# Patient Record
Sex: MALE | Race: WHITE | NOT HISPANIC OR LATINO | Employment: FULL TIME | URBAN - METROPOLITAN AREA
[De-identification: names, ages, dates, MRNs, and addresses within clinical notes are randomized per-mention and may not be internally consistent; named-entity substitution may affect disease eponyms.]

---

## 2018-05-23 ENCOUNTER — OFFICE VISIT (OUTPATIENT)
Dept: INTERNAL MEDICINE | Facility: CLINIC | Age: 53
End: 2018-05-23
Payer: COMMERCIAL

## 2018-05-23 VITALS
WEIGHT: 179.4 LBS | TEMPERATURE: 98 F | RESPIRATION RATE: 16 BRPM | SYSTOLIC BLOOD PRESSURE: 108 MMHG | HEIGHT: 70 IN | DIASTOLIC BLOOD PRESSURE: 62 MMHG | BODY MASS INDEX: 25.68 KG/M2 | HEART RATE: 56 BPM

## 2018-05-23 DIAGNOSIS — Z71.84 COUNSELING ABOUT TRAVEL: Primary | ICD-10-CM

## 2018-05-23 DIAGNOSIS — Z23 NEED FOR MMR VACCINE: ICD-10-CM

## 2018-05-23 DIAGNOSIS — Z23 NEED FOR VACCINATION: ICD-10-CM

## 2018-05-23 PROCEDURE — 90471 IMMUNIZATION ADMIN: CPT | Performed by: PHYSICIAN ASSISTANT

## 2018-05-23 PROCEDURE — 99214 OFFICE O/P EST MOD 30 MIN: CPT | Performed by: PHYSICIAN ASSISTANT

## 2018-05-23 PROCEDURE — 90707 MMR VACCINE SC: CPT | Performed by: PHYSICIAN ASSISTANT

## 2018-05-23 RX ORDER — ACETAZOLAMIDE 125 MG/1
125 TABLET ORAL 2 TIMES DAILY
Qty: 6 TABLET | Refills: 0 | Status: SHIPPED | OUTPATIENT
Start: 2018-05-23 | End: 2019-10-28

## 2018-05-23 RX ORDER — CIPROFLOXACIN 500 MG/1
500 TABLET, FILM COATED ORAL 2 TIMES DAILY
Qty: 6 TABLET | Refills: 0 | Status: SHIPPED | OUTPATIENT
Start: 2018-05-23 | End: 2019-10-28

## 2018-05-23 ASSESSMENT — PAIN SCALES - GENERAL: PAINLEVEL: NO PAIN (0)

## 2018-05-23 NOTE — MR AVS SNAPSHOT
"              After Visit Summary   2018    Skyler Villafana    MRN: [id number]           Patient Information     Date Of Birth          1965        Visit Information        Provider Department      2018 4:40 PM Nazanin Maradiaga PA-C [facility]        Today's Diagnoses     Counseling about travel    -  1    Need for MMR vaccine           Follow-ups after your visit        Who to contact     If you have questions or need follow up information about today's clinic visit or your schedule please contact [facility] directly at [phone].  Normal or non-critical lab and imaging results will be communicated to you by Segetishart, letter or phone within 4 business days after the clinic has received the results. If you do not hear from us within 7 days, please contact the clinic through Segetishart or phone. If you have a critical or abnormal lab result, we will notify you by phone as soon as possible.  Submit refill requests through uSpeak or call your pharmacy and they will forward the refill request to us. Please allow 3 business days for your refill to be completed.          Additional Information About Your Visit        MyChart Information     uSpeak lets you send messages to your doctor, view your test results, renew your prescriptions, schedule appointments and more. To sign up, go to [email or web address] . Click on \"Log in\" on the left side of the screen, which will take you to the Welcome page. Then click on \"Sign up Now\" on the right side of the page.     You will be asked to enter the access code listed below, as well as some personal information. Please follow the directions to create your username and password.     Your access code is: [email or web address]  Expires: 2018  5:05 PM     Your access code will  in 90 days. If you need help or a new code, please call your [facility] or [phone].        Care EveryWhere ID     This is your " "Care EveryWhere ID. This could be used by other organizations to access your [facility] medical records  [id number]        Your Vitals Were     Pulse Temperature Respirations Height BMI (Body Mass Index)       56 98  F (36.7  C) (Oral) 16 5' 9.5\" (1.765 m) 26.11 kg/m2        Blood Pressure from Last 3 Encounters:   05/23/18 108/62   11/06/15 124/70   07/29/10 122/64    Weight from Last 3 Encounters:   05/23/18 179 lb 6.4 oz (81.4 kg)   11/06/15 173 lb (78.5 kg)   07/29/10 170 lb (77.1 kg)              Today, you had the following     No orders found for display         Today's Medication Changes          These changes are accurate as of 5/23/18  5:05 PM.  If you have any questions, ask your nurse or doctor.               Start taking these medicines.        Dose/Directions    acetaZOLAMIDE 125 MG tablet   Commonly known as:  DIAMOX   Used for:  Counseling about travel   Started by:  Nazanin Maradiaga PA-C        Dose:  125 mg   Take 1 tablet (125 mg) by mouth 2 times daily   Quantity:  6 tablet   Refills:  0       ciprofloxacin 500 MG tablet   Commonly known as:  CIPRO   Used for:  Counseling about travel   Started by:  Nazanin Maradiaga PA-C        Dose:  500 mg   Take 1 tablet (500 mg) by mouth 2 times daily   Quantity:  6 tablet   Refills:  0            Where to get your medicines      These medications were sent to [facility] [id number] [place] [address] [address] [facility] [place] [address], [facility] [address]     Phone:  [phone]     ciprofloxacin 500 MG tablet         Some of these will need a paper prescription and others can be bought over the counter.  Ask your nurse if you have questions.     Bring a paper prescription for each of these medications     acetaZOLAMIDE 125 MG tablet                Primary Care Provider    None Specified       No primary provider on file.        Equal Access to Services     MANDIE YANG AH: Adonay Galicia, " yohana sue, alana kacorinna maldonado, fredrick carmonaaaalyssa ah. [facility] [phone].    ATENCIÓN: Si nirali mckinney, tiene a smalls disposición servicios gratuitos de asistencia lingüística. Elo al [phone].    We comply with applicable federal civil rights laws and [place] laws. We do not discriminate on the basis of race, color, national origin, age, disability, sex, sexual orientation, or gender identity.            Thank you!     Thank you for choosing [facility]  for your care. Our goal is always to provide you with excellent care. Hearing back from our patients is one way we can continue to improve our services. Please take a few minutes to complete the written survey that you may receive in the mail after your visit with us. Thank you!             Your Updated Medication List - Protect others around you: Learn how to safely use, store and throw away your medicines at www.disposemymeds.org.          This list is accurate as of 5/23/18  5:05 PM.  Always use your most recent med list.                   Brand Name Dispense Instructions for use Diagnosis    acetaZOLAMIDE 125 MG tablet    DIAMOX    6 tablet    Take 1 tablet (125 mg) by mouth 2 times daily    Counseling about travel       ciprofloxacin 500 MG tablet    CIPRO    6 tablet    Take 1 tablet (500 mg) by mouth 2 times daily    Counseling about travel       NO ACTIVE MEDICATIONS      .

## 2018-05-23 NOTE — PROGRESS NOTES
SUBJECTIVE:   Skyler Villafana is a 52 year old male who presents to clinic today for the following health issues:      Pt is here today because he needs a MMR shot for traveling to [facility] in September, along with some anti diarrheal medication as well.     SUBJECTIVE: Skyler Villafana , a 52 year old  male, presents for counseling and information regarding upcoming travel to [facility]. Special medical concerns   include: none He. anticipates the following unusual   exposures: altitude will be > [address] ft .    Past Medical History:   Diagnosis Date     NO ACTIVE PROBLEMS       Immunization History   Administered Date(s) Administered     HEPA 02/05/1998, 04/18/2012, 11/15/2012     Influenza (H1N1) 01/27/2010     Influenza (IIV3) PF 11/12/2005, 11/05/2009     MMR 01/13/1970     TDAP Vaccine (Adacel) 04/13/2012     Typhoid IM 04/18/2012       Current Outpatient Prescriptions   Medication Sig Dispense Refill     NO ACTIVE MEDICATIONS .       Allergies   Allergen Reactions     No Known Allergies      Seasonal Allergies         EXAM: deferred    Immunizations discussed include: Measles/Mumps/Rubella    Patient updated on Yellow Fever and Typhoid   Malaraia prophylaxis recommended: none  Symptomatic treatment for traveler's diarrhea: ciprofloxacin    ASSESSMENT/PLAN:  No diagnosis found.  I have reviewed general recommendations for safe travel   including: food/water precautions, insect avoidance, safe sex   practices given high prevalence of HIV and other STDs,   roadway safety. Educational materials and links to the [facility]   Traveler's health website have been provided.    Total time 25 minutes, greater than 50 percent in counseling   and coordination of care.      Problem list and histories reviewed & adjusted, as indicated.  Additional history: as documented    Labs reviewed in EPIC    Reviewed and updated as needed this visit by clinical staff  Tobacco  Allergies  Meds       Reviewed and updated as needed this visit by  Provider  Allergies  Meds

## 2018-05-23 NOTE — NURSING NOTE
Screening Questionnaire for Adult Immunization    Are you sick today?   No   Do you have allergies to medications, food, a vaccine component or latex?   No   Have you ever had a serious reaction after receiving a vaccination?   No   Do you have a long-term health problem with heart disease, lung disease, asthma, kidney disease, metabolic disease (e.g. diabetes), anemia, or other blood disorder?   No   Do you have cancer, leukemia, HIV/AIDS, or any other immune system problem?   No   In the past 3 months, have you taken medications that affect  your immune system, such as prednisone, other steroids, or anticancer drugs; drugs for the treatment of rheumatoid arthritis, Crohn s disease, or psoriasis; or have you had radiation treatments?   No   Have you had a seizure, or a brain or other nervous system problem?   No   During the past year, have you received a transfusion of blood or blood     products, or been given immune (gamma) globulin or antiviral drug?   No   For women: Are you pregnant or is there a chance you could become        pregnant during the next month?   No   Have you received any vaccinations in the past 4 weeks?   No     Immunization questionnaire answers were all negative.        Per orders of  Nazanin Maradiaga, injection of MMR given by Nazanin Davalos. Patient instructed to remain in clinic for 15 minutes afterwards, and to report any adverse reaction to me immediately.       Screening performed by Nazanin Davalos on 5/23/2018 at 5:09 PM.

## 2019-10-25 ENCOUNTER — OFFICE VISIT (OUTPATIENT)
Dept: URGENT CARE | Facility: URGENT CARE | Age: 54
End: 2019-10-25
Payer: COMMERCIAL

## 2019-10-25 ENCOUNTER — ANCILLARY PROCEDURE (OUTPATIENT)
Dept: GENERAL RADIOLOGY | Facility: CLINIC | Age: 54
End: 2019-10-25
Attending: FAMILY MEDICINE
Payer: COMMERCIAL

## 2019-10-25 VITALS
HEART RATE: 64 BPM | BODY MASS INDEX: 26.2 KG/M2 | OXYGEN SATURATION: 98 % | HEIGHT: 70 IN | WEIGHT: 183 LBS | DIASTOLIC BLOOD PRESSURE: 82 MMHG | RESPIRATION RATE: 16 BRPM | TEMPERATURE: 98.1 F | SYSTOLIC BLOOD PRESSURE: 118 MMHG

## 2019-10-25 DIAGNOSIS — M89.8X1 COLLAR BONE PAIN: Primary | ICD-10-CM

## 2019-10-25 PROCEDURE — 73000 X-RAY EXAM OF COLLAR BONE: CPT | Mod: RT

## 2019-10-25 PROCEDURE — 99203 OFFICE O/P NEW LOW 30 MIN: CPT | Performed by: FAMILY MEDICINE

## 2019-10-25 ASSESSMENT — MIFFLIN-ST. JEOR: SCORE: 1681.33

## 2019-10-25 NOTE — PROGRESS NOTES
"Chief Complaint   Patient presents with     Urgent Care     right collarbone pain     SUBJECTIVE:  Chief Complaint   Patient presents with     Urgent Care     right collarbone pain     Skyler Villafana is a 54 year old male who presents with a chief complaint of right collar bone  pain and tenderness.  Symptoms began 2 week(s) ago, are moderate and sudden onset  Context:  Injury:No. He has been lifting heavy boxes as he is moving  How: lifting immediate pain.   Pain exacerbated by over the head activity Relieved by rest.  He treated it initially with Ibuprofen. This is not the first time this type of injury has occurred to this patient.   He has h/o clavicle fracture almost 20 yrs back   Past Medical History:   Diagnosis Date     NO ACTIVE PROBLEMS      Current Outpatient Medications   Medication Sig Dispense Refill     acetaZOLAMIDE (DIAMOX) 125 MG tablet Take 1 tablet (125 mg) by mouth 2 times daily (Patient not taking: Reported on 10/25/2019) 6 tablet 0     ciprofloxacin (CIPRO) 500 MG tablet Take 1 tablet (500 mg) by mouth 2 times daily 6 tablet 0     NO ACTIVE MEDICATIONS .       Social History     Tobacco Use     Smoking status: Never Smoker     Smokeless tobacco: Never Used   Substance Use Topics     Alcohol use: No     Alcohol/week: 0.0 standard drinks       ROS:  10 point ROS of systems including Constitutional, Eyes, Respiratory, Cardiovascular, Gastroenterology, Genitourinary, Integumentary,, Psychiatric were all negative except for pertinent positives noted in my HPI           EXAM:   /82   Pulse 64   Temp 98.1  F (36.7  C) (Oral)   Resp 16   Ht 1.778 m (5' 10\")   Wt 83 kg (183 lb)   SpO2 98%   BMI 26.26 kg/m    M/S Exam:rt clavicle tenderness to palpationGENERAL APPEARANCE: healthy, alert and no distress  EXTREMITIES: peripheral pulses normal  SKIN: no suspicious lesions or rashes  NEURO: Normal strength and tone, sensory exam grossly normal, mentation intact and speech normal    X-RAY was " done. I did not see any new fracture old fracture is noted     ASSESSMENT:     Encounter Diagnosis   Name Primary?     Collar bone pain Yes       PLAN:  See orders in epic  Reviewed with pt about the result discussed with patient to do ibuprofen 600 mg with food every 6-8 hours for next 2 to 3 days.  Follow up if  symptoms fail to improve or worsens   Pt understood and agreed with plan     Ariana Ruiz MD

## 2019-10-28 ENCOUNTER — OFFICE VISIT (OUTPATIENT)
Dept: FAMILY MEDICINE | Facility: CLINIC | Age: 54
End: 2019-10-28
Payer: COMMERCIAL

## 2019-10-28 VITALS
TEMPERATURE: 98.6 F | BODY MASS INDEX: 26.76 KG/M2 | DIASTOLIC BLOOD PRESSURE: 62 MMHG | WEIGHT: 186.5 LBS | OXYGEN SATURATION: 98 % | SYSTOLIC BLOOD PRESSURE: 116 MMHG | HEART RATE: 68 BPM

## 2019-10-28 DIAGNOSIS — Z71.84 ENCOUNTER FOR COUNSELING FOR TRAVEL: Primary | ICD-10-CM

## 2019-10-28 PROCEDURE — 99401 PREV MED CNSL INDIV APPRX 15: CPT | Performed by: PHYSICIAN ASSISTANT

## 2019-10-28 RX ORDER — AZITHROMYCIN 500 MG/1
TABLET, FILM COATED ORAL
Qty: 3 TABLET | Refills: 0 | Status: SHIPPED | OUTPATIENT
Start: 2019-10-28

## 2019-10-28 NOTE — PATIENT INSTRUCTIONS
"See travel packet provided  Recommend ultrathon (mosquito repellant), pepto bismol and imodium  The food and drink choices you make while traveling can impact your likelihood of getting sick.   If you aren't sure if a food or drink is safe, the saying \" BOIL IT, COOK IT, PEEL IT, OR FORGET IT\" can help you decide whether it's okay to consume.   Also bring hand  and sun screen with you.  Safe Travels       Today October 28, 2019 you received the    Typhoid - Oral. This prescription has been faxed to your pharmacy, please take as directed. This will last for 5 years.   .    These appointments can be made as a NURSE ONLY visit.    **It is very important for the vaccinations to be given on the scheduled day(s), this helps ensure you receive the full effectiveness of the vaccine.**    Please call [facility] with any questions [phone]    Thank you for visiting [facility]    "

## 2019-10-28 NOTE — PROGRESS NOTES
SUBJECTIVE: Skyler Villafana , a 54 year old  male, presents for counseling and information regarding upcoming travel to [place]. Special medical concerns include: none. He anticipates the following unusual exposures: none.    Itinerary:  [place]    Departure Date: 12/07/19 Return date: 12/15/19    Reason for travel (i.e. Business, pleasure): pleasure    Visiting an urban or rural area?: both     Accommodations (i.e. hotel, hostel, friends, family, etc): hotel     Women - First day of your last period: NA    IMMUNIZATION HISTORY  Have you received any vaccinations in the past 4 weeks?  Yes  Have you ever fainted from having your blood drawn or from an injection?  No  Have you ever had a fever reaction to vaccination?  No  Have you ever had any bad reaction or side effect from any vaccination?  No  Have you ever had hepatitis A or B vaccine?  Yes  Do you live (or work closely) with anyone who has AIDS, an AIDS-like condition, any other immune disorder or who is on chemotherapy for cancer?  No  Have you received any injection of immune globulin or any blood products during the past 12 months?  No    GENERAL MEDICAL HISTORY  Do you have a medical condition that warrants maintenance medication or physician follow-up?  No  Do you have a medical condition that is stable now, but that may recur while traveling?  No  Has your spleen been removed?  No  Have you had an acute illness or a fever in the past 48 hours?  No  Are you pregnant, or might you become pregnant on this trip?  Any chance of pregnancy?  No  Are you breastfeeding?  No  Do you have HIV, AIDS, an AIDS-like condition, any other immune disorder, leukemia or cancer?  No  Do you have a severe combined immunodeficiency disease?  No  Have you had your thymus gland removed or history of problems with your thymus, such as myasthenia gravis, DiGeorge syndrome, or thymoma?  No    Do you have severe thrombocytopenia (low platelet count) or a coagulation disorder?  No  Have  you ever had a convulsion, seizure, epilepsy, neurologic condition or brain infection?  No  Do you have any stomach conditions?  No  Do you have a G6PD deficiency?  No  Do you have severe renal or kidney impairment?  No  Do you have a history of psychiatric problems?  No  Do you have a problem with strange dreams and/or nightmares?  No  Do you have insomnia?  No  Do you have problems with vaginitis?  No  Do you have psoriasis?  No  Are you prone to motion sickness?  No  Have you ever had headaches, nausea, vomiting, or breathing problems from altitude exposure?  No      Past Medical History:   Diagnosis Date     NO ACTIVE PROBLEMS       Immunization History   Administered Date(s) Administered     HEPA 02/05/1998, 04/18/2012, 11/15/2012     Influenza (H1N1) 01/27/2010     Influenza (IIV3) PF 11/12/2005, 11/05/2009     MMR 01/13/1970, 05/23/2018     TDAP Vaccine (Adacel) 04/13/2012     Typhoid IM 04/18/2012       Current Outpatient Medications   Medication Sig Dispense Refill     acetaZOLAMIDE (DIAMOX) 125 MG tablet Take 1 tablet (125 mg) by mouth 2 times daily (Patient not taking: Reported on 10/25/2019) 6 tablet 0     ciprofloxacin (CIPRO) 500 MG tablet Take 1 tablet (500 mg) by mouth 2 times daily 6 tablet 0     NO ACTIVE MEDICATIONS .       Allergies   Allergen Reactions     No Known Allergies      Seasonal Allergies         EXAM: deferred    Immunizations discussed include: Typhoid  Malaraia prophylaxis recommended: not needed- only going to an area with malaria risk for a 1 day long day trip (not spending the night)  Symptomatic treatment for traveler's diarrhea: bismuth subsalicylate, loperamide/diphenoxylate and azithromycin    ASSESSMENT/PLAN:    (Z71.84) Encounter for counseling for travel  (primary encounter diagnosis)    Comment: Oral typhoid vaccines today. Patient will return or follow-up with PCP as needed. Prophylaxis given for Traveler's diarrhea and is not needed for Malaria. All questions were  answered.     Plan: typhoid (VIVOTIF) CR capsule, azithromycin         (ZITHROMAX) 500 MG tablet              I have reviewed general recommendations for safe travel   including: food/water precautions, insect avoidance, safe sex   practices given high prevalence of HIV and other STDs,   roadway safety. Educational materials and links to the CDC   Traveler's health website have been provided.    Total time 20 minutes, greater than 50 percent in counseling   and coordination of care.

## 2019-12-09 ENCOUNTER — HEALTH MAINTENANCE LETTER (OUTPATIENT)
Age: 54
End: 2019-12-09

## 2021-01-15 ENCOUNTER — HEALTH MAINTENANCE LETTER (OUTPATIENT)
Age: 56
End: 2021-01-15

## 2021-04-07 ENCOUNTER — IMMUNIZATION (OUTPATIENT)
Dept: LAB | Facility: CLINIC | Age: 56
End: 2021-04-07
Payer: COMMERCIAL

## 2021-10-24 ENCOUNTER — HEALTH MAINTENANCE LETTER (OUTPATIENT)
Age: 56
End: 2021-10-24

## 2022-02-13 ENCOUNTER — HEALTH MAINTENANCE LETTER (OUTPATIENT)
Age: 57
End: 2022-02-13

## 2022-10-15 ENCOUNTER — HEALTH MAINTENANCE LETTER (OUTPATIENT)
Age: 57
End: 2022-10-15

## 2023-03-26 ENCOUNTER — HEALTH MAINTENANCE LETTER (OUTPATIENT)
Age: 58
End: 2023-03-26

## 2023-06-08 ENCOUNTER — OFFICE VISIT (OUTPATIENT)
Dept: URGENT CARE | Facility: URGENT CARE | Age: 58
End: 2023-06-08
Payer: COMMERCIAL

## 2023-06-08 ENCOUNTER — ANCILLARY PROCEDURE (OUTPATIENT)
Dept: GENERAL RADIOLOGY | Facility: CLINIC | Age: 58
End: 2023-06-08
Attending: NURSE PRACTITIONER
Payer: COMMERCIAL

## 2023-06-08 VITALS
RESPIRATION RATE: 18 BRPM | HEART RATE: 77 BPM | BODY MASS INDEX: 26.69 KG/M2 | WEIGHT: 186 LBS | DIASTOLIC BLOOD PRESSURE: 84 MMHG | SYSTOLIC BLOOD PRESSURE: 127 MMHG | TEMPERATURE: 98.3 F | OXYGEN SATURATION: 98 %

## 2023-06-08 DIAGNOSIS — M25.522 LEFT ELBOW PAIN: ICD-10-CM

## 2023-06-08 DIAGNOSIS — M25.422 EFFUSION OF LEFT ELBOW: ICD-10-CM
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
## 2023-06-08 DIAGNOSIS — M25.522 LEFT ELBOW PAIN: Primary | ICD-10-CM

## 2023-06-08 PROCEDURE — 99204 OFFICE O/P NEW MOD 45 MIN: CPT | Performed by: NURSE PRACTITIONER

## 2023-06-08 PROCEDURE — 73080 X-RAY EXAM OF ELBOW: CPT | Mod: TC | Performed by: RADIOLOGY

## 2023-06-08 NOTE — PATIENT INSTRUCTIONS
Ibuprofen 3 tablets three times a day with food.     Ice elbow for 15-20 minutes 2-3 times a day    If swelling doubles in size return for drainage.

## 2023-06-08 NOTE — PROGRESS NOTES
Chief Complaint   Patient presents with     Urgent Care     Left elbow injury Sunday or Monday          ICD-10-CM    1. Left elbow pain  M25.522 XR Elbow Left G/E 3 Views      2. Effusion of left elbow  M25.422 XR Elbow Left G/E 3 Views      3. Fall, initial encounter  W19.XXXA XR Elbow Left G/E 3 Views      Effusion has not large enough to drain at this point.  He will return if it becomes larger for consideration of drainage.  Recommended ice and scheduled NSAIDs with food for the next week.    Xray - Reviewed and interpreted by me.  Left elbow x-ray shows no acute fractures or dislocations, mild degenerative changes are noted    Subjective     Skyler Villafana is an 57 year old male who presents to clinic today for pain in the left elbow after falling 5 days ago.  He tripped and came down onto the left side of the body hitting the elbow.  He has not had a great deal of pain since then but noticed it was swelling today.  He denies any previous injuries to this area.  He denies any other injuries from the fall.    Objective    /84   Pulse 77   Temp 98.3  F (36.8  C)   Resp 18   Wt 84.4 kg (186 lb)   SpO2 98%   BMI 26.69 kg/m    Nurses notes and VS have been reviewed.    Physical Exam       GENERAL APPEARANCE: healthy appearing, alert     MS: extremities normal- no gross deformities noted; normal muscle tone, except for left elbow which has a small effusion present, slight tenderness on the proximal ulnar head, he does have full range of motion.     SKIN: no suspicious lesions or rashes     NEURO: Normal strength and tone, mentation intact and speech normal     PSYCH: normal thought process; no significant mood disturbance      CELINA Blunt, CNP  [facility] Provider    The use of Dragon/ITao dictation services may have been used to construct the content in this note; any grammatical or spelling errors are non-intentional. Please contact the author of this note directly if you are in  need of any clarification.

## 2023-11-18 ENCOUNTER — OFFICE VISIT (OUTPATIENT)
Dept: URGENT CARE | Facility: URGENT CARE | Age: 58
End: 2023-11-18
Payer: COMMERCIAL

## 2023-11-18 VITALS
OXYGEN SATURATION: 97 % | WEIGHT: 186 LBS | RESPIRATION RATE: 18 BRPM | SYSTOLIC BLOOD PRESSURE: 126 MMHG | HEART RATE: 79 BPM | TEMPERATURE: 98.1 F | DIASTOLIC BLOOD PRESSURE: 78 MMHG | BODY MASS INDEX: 26.69 KG/M2

## 2023-11-18 DIAGNOSIS — J30.89 SEASONAL ALLERGIC RHINITIS DUE TO OTHER ALLERGIC TRIGGER: ICD-10-CM

## 2023-11-18 DIAGNOSIS — J01.90 ACUTE SINUSITIS WITH COEXISTING CONDITION REQUIRING PROPHYLACTIC TREATMENT: Primary | ICD-10-CM

## 2023-11-18 DIAGNOSIS — R05.8 PRODUCTIVE COUGH: ICD-10-CM

## 2023-11-18 PROCEDURE — 99214 OFFICE O/P EST MOD 30 MIN: CPT | Performed by: PHYSICIAN ASSISTANT

## 2023-11-18 RX ORDER — CODEINE PHOSPHATE AND GUAIFENESIN 10; 100 MG/5ML; MG/5ML
1-2 SOLUTION ORAL EVERY 4 HOURS PRN
Qty: 118 ML | Refills: 0 | Status: SHIPPED | OUTPATIENT
Start: 2023-11-18

## 2023-11-18 RX ORDER — FLUTICASONE PROPIONATE 50 MCG
1 SPRAY, SUSPENSION (ML) NASAL DAILY
Qty: 18.2 ML | Refills: 1 | Status: SHIPPED | OUTPATIENT
Start: 2023-11-18

## 2023-11-18 RX ORDER — DOXYCYCLINE 100 MG/1
100 CAPSULE ORAL 2 TIMES DAILY
Qty: 20 CAPSULE | Refills: 0 | Status: SHIPPED | OUTPATIENT
Start: 2023-11-18 | End: 2023-11-28

## 2023-11-18 RX ORDER — LEVOCETIRIZINE DIHYDROCHLORIDE 5 MG/1
5 TABLET, FILM COATED ORAL EVERY EVENING
Qty: 30 TABLET | Refills: 3 | Status: SHIPPED | OUTPATIENT
Start: 2023-11-18

## 2023-11-18 NOTE — PROGRESS NOTES
Patient presents with:  Urgent Care: Coughing for over 2 weeks now, covid tested 4-5 days ago     (J01.90) Acute sinusitis with coexisting condition requiring prophylactic treatment  (primary encounter diagnosis)  Comment:   Plan: doxycycline hyclate (VIBRAMYCIN) 100 MG         capsule, fluticasone (FLONASE) 50 MCG/ACT nasal        spray            (R05.8) Productive cough  Comment:   Plan: doxycycline hyclate (VIBRAMYCIN) 100 MG         capsule, guaiFENesin-codeine (ROBITUSSIN AC)         100-10 MG/5ML solution            (J30.89) Seasonal allergic rhinitis due to other allergic trigger  Comment:   Plan: levocetirizine (XYZAL) 5 MG tablet        Start after you have been on the antibiotic for a day or two.  Stay on until we have had a hard freeze, minimum 2 weeks.      Also consider having skin lesion on right cheek checked as it is non healing.      At the end of the encounter, I discussed results, diagnosis, medications. Discussed red flags for immediate return to clinic as well as indications for follow up if no improvement. Patient understood and agreed to plan. Patient was stable for discharge           SUBJECTIVE:   Skyler Villafana is a 58 year old male who presents today with a cough for the past 2 weeks, intermittently productive.  Post nasal drip as well.  Denies any fevers.  Negative home Covid tests 4-5 days ago.  Throat discomfort.      Seemed to improve a few days ago but then symptoms returned and are worse.      Cough is worse at night, he sleeps with his head elevated which helps some.      I also noticed a red scaling rash on his right cheek, patient's it has been present for a couple of months, it started out as a scratch and he intermittently scratches it.  Denies any pain.    There is no problem list on file for this patient.        Past Medical History:   Diagnosis Date    NO ACTIVE PROBLEMS          Current Outpatient Medications   Medication Sig Dispense Refill    Multiple Vitamins-Iron  (DAILY-LOR/IRON/BETA-CAROTENE) TABS TAKE 1 TABLET BY MOUTH DAILY. (Patient not taking: Reported on 10/19/2020) 30 tablet 7     Social History     Tobacco Use    Smoking status: Never Smoker    Smokeless tobacco: Never Used   Substance Use Topics    Alcohol use: Not on file     Family History   Problem Relation Age of Onset    Diabetes Mother     Diabetes Father          ROS:    10 point ROS of systems including Constitutional, Eyes, Respiratory, Cardiovascular, Gastroenterology, Genitourinary, Integumentary, Muscularskeletal, Psychiatric ,neurological were all negative except for pertinent positives noted in my HPI       OBJECTIVE:  /78   Pulse 79   Temp 98.1  F (36.7  C)   Resp 18   Wt 84.4 kg (186 lb)   SpO2 97%   BMI 26.69 kg/m    Physical Exam:  GENERAL APPEARANCE: healthy, alert and no distress  EYES: EOMI,  PERRL, conjunctiva clear  HENT: ear canals and TM's normal.  Nose and mouth without ulcers, erythema or lesions  HENT: nasal turbinates boggy with bluish hue and rhinorrhea yellow  NECK: supple, nontender, no lymphadenopathy  RESP: lungs clear to auscultation - no rales, rhonchi or wheezes  CV: regular rates and rhythm, normal S1 S2, no murmur noted  ABDOMEN:  soft, nontender, no HSM or masses and bowel sounds normal  NEURO: Normal strength and tone, sensory exam grossly normal,  normal speech and mentation  SKIN: Erythematous scaling lesion on right mid cheek.  No drainage.  Less than 1 cm x 5 mm.

## 2023-11-18 NOTE — PATIENT INSTRUCTIONS
(J01.90) Acute sinusitis with coexisting condition requiring prophylactic treatment  (primary encounter diagnosis)  Comment:   Plan: doxycycline hyclate (VIBRAMYCIN) 100 MG         capsule, fluticasone (FLONASE) 50 MCG/ACT nasal        spray            (R05.8) Productive cough  Comment:   Plan: doxycycline hyclate (VIBRAMYCIN) 100 MG         capsule, guaiFENesin-codeine (ROBITUSSIN AC)         100-10 MG/5ML solution            (J30.89) Seasonal allergic rhinitis due to other allergic trigger  Comment:   Plan: levocetirizine (XYZAL) 5 MG tablet        Start after you have been on the antibiotic for a day or two.  Stay on until we have had a hard freeze, minimum 2 weeks.      Also consider having skin lesion on right cheek checked as it is non healing.

## 2024-05-26 ENCOUNTER — HEALTH MAINTENANCE LETTER (OUTPATIENT)
Age: 59
End: 2024-05-26

## 2025-06-14 ENCOUNTER — HEALTH MAINTENANCE LETTER (OUTPATIENT)
Age: 60
End: 2025-06-14